# Patient Record
(demographics unavailable — no encounter records)

---

## 2025-04-17 NOTE — IMAGING
[de-identified] :   On examination of the right hand, patient has significant swelling, tenderness over the 3rd, 4th and 5th metacarpal base. Patient has good range of motion about the digits, no extension lag noted. Mild ecchymosis. Neurovascular intact.  Radiographs of the right hand with [facility] view were done, there is an intra-articular mildly displaced fracture to the base of the fourth metacarpal, and nondisplaced fracture over the base of the thumb metacarpal, there is an avulsion of the hamate.  There is a mild dislocation of the base what appears to be the fifth metacarpal base.

## 2025-04-17 NOTE — HISTORY OF PRESENT ILLNESS
[de-identified] : 16-year-old male here for evaluation of injury sustained to the right hand, patient is accompanied by mom. Patient states that this past Monday, April 14, 2025 he was playing horsing and he threw a punch, injuring his hand. Patient was seen in urgent center, he was splinted, he was advised of a fracture and advised to follow-up with orthopedic.

## 2025-04-17 NOTE — DISCUSSION/SUMMARY
[de-identified] : Impression: Patient was advised of a comminuted fracture to the base of the 4 metacarpal, nondisplaced fracture at the base of the 4 metacarpal and a possible fracture dislocation to the fifth metacarpal base.  Plan: Patient was offered a casting with a mold over the fracture site, short arm cast was placed, repeat x-rays were done, on the repeat x-rays of the right hand it was noted that he was a fracture dislocation over the fifth metacarpal base.  X-rays were discussed with Dr. Sinclair.   At this time a repeat reduction was done, patient was given a hematoma block.  Patient tolerated well the reduction, he was placed in a short arm cast.    Repeated x-rays of the right hand were done, showing an improved alignment of the fracture and dislocation of the 4th and 5th metacarpal base. X-rays were discussed also with Dr. Sinclair.    Follow-up: 2 weeks follow-up with Dr. Sinclair on repeat x-ray. Patient was advised to keep the cast clean and dry. Patient was advised that he is currently casted for about 6 weeks.

## 2025-05-05 NOTE — DATA REVIEWED
[FreeTextEntry1] : Radiographs 3 views of the right hand reviewed showing acceptable alignment and position of his quite metacarpal joint at the base.

## 2025-05-05 NOTE — ASSESSMENT
[FreeTextEntry1] : Patient had called metacarpal fracture dislocation base of the 5th and 4th metacarpal area.  His fracture was reduced acceptably.  He will see us back in about 10 days with cast off radiographs of his right hand.  They were then going to removable brace which she is bringing with him.  See me back couple weeks after that.  Deformity and tenderness are discussed.

## 2025-05-05 NOTE — PHYSICAL EXAM
[de-identified] : Patient's cast is in good condition.  He has good range of motion of the fingers.  He has no erythema ecchymoses or abrasions.

## 2025-05-05 NOTE — HISTORY OF PRESENT ILLNESS
[de-identified] : 16-year-old male had an injury to his right hand.  Fracture at the base of the fifth hamate and fourth metacarpal base.  Comes in today for evaluation.  Is been in a cast since.  The office at his last visit.

## 2025-05-15 NOTE — HISTORY OF PRESENT ILLNESS
[de-identified] : Patient is a 15 yo male here for repeat eval of his right hand. He is status post a 4th and 5th  base fx. He is feeling better. Today I removed his short arm cast.

## 2025-05-15 NOTE — PHYSICAL EXAM
[de-identified] : Physical exam of his right hand: skin is intact after cast removal. Nontender over fx site. Good ROM of the hand. Sensory and motor are intact.

## 2025-05-15 NOTE — DATA REVIEWED
[FreeTextEntry1] : 3 x-ray views taken in the office today of his right hand show a healing 4th and 5th MC base fx.

## 2025-05-15 NOTE — DISCUSSION/SUMMARY
[de-identified] : Today I removed the cast. The patient was placed in a cock-up wrist brace to get acclimated to being out of the cast. It can be removed to work on range of motion and bathing. He understands that fractures take about 6 weeks to heal.  Random residual pain can occur for 6 months to a year. He will continue to avoid any pushing, pulling, or heavy lifting. Follow up in about 3-4 weeks for repeat x-ray and evaluation. All questions were answered today.